# Patient Record
Sex: FEMALE | Race: WHITE | URBAN - METROPOLITAN AREA
[De-identification: names, ages, dates, MRNs, and addresses within clinical notes are randomized per-mention and may not be internally consistent; named-entity substitution may affect disease eponyms.]

---

## 2017-01-23 ENCOUNTER — FOLLOW UP ESTABLISHED (OUTPATIENT)
Dept: URBAN - METROPOLITAN AREA CLINIC 24 | Facility: CLINIC | Age: 72
End: 2017-01-23
Payer: COMMERCIAL

## 2017-01-23 PROCEDURE — 92235 FLUORESCEIN ANGRPH MLTIFRAME: CPT | Performed by: OPHTHALMOLOGY

## 2017-01-23 PROCEDURE — 67028 INJECTION EYE DRUG: CPT | Performed by: OPHTHALMOLOGY

## 2017-01-23 PROCEDURE — 92134 CPTRZ OPH DX IMG PST SGM RTA: CPT | Performed by: OPHTHALMOLOGY

## 2017-01-23 ASSESSMENT — INTRAOCULAR PRESSURE
OS: 12
OD: 15

## 2017-02-09 ENCOUNTER — FOLLOW UP ESTABLISHED (OUTPATIENT)
Dept: URBAN - METROPOLITAN AREA CLINIC 24 | Facility: CLINIC | Age: 72
End: 2017-02-09
Payer: COMMERCIAL

## 2017-02-09 DIAGNOSIS — H40.2211 CHRONIC ANGLE-CLOSURE GLAUCOMA, RIGHT EYE, MILD STAGE: ICD-10-CM

## 2017-02-09 PROCEDURE — 92012 INTRM OPH EXAM EST PATIENT: CPT | Performed by: OPTOMETRIST

## 2017-02-09 RX ORDER — DORZOLAMIDE HYDROCHLORIDE AND TIMOLOL MALEATE 20; 5 MG/ML; MG/ML
SOLUTION OPHTHALMIC
Qty: 1 | Refills: 5 | Status: INACTIVE
Start: 2017-02-09 | End: 2017-12-20

## 2017-02-09 ASSESSMENT — INTRAOCULAR PRESSURE
OS: 27
OD: 17

## 2017-03-03 ENCOUNTER — FOLLOW UP ESTABLISHED (OUTPATIENT)
Dept: URBAN - METROPOLITAN AREA CLINIC 24 | Facility: CLINIC | Age: 72
End: 2017-03-03
Payer: COMMERCIAL

## 2017-03-03 PROCEDURE — 92083 EXTENDED VISUAL FIELD XM: CPT | Performed by: OPTOMETRIST

## 2017-03-03 PROCEDURE — 99214 OFFICE O/P EST MOD 30 MIN: CPT | Performed by: OPTOMETRIST

## 2017-03-03 PROCEDURE — 92134 CPTRZ OPH DX IMG PST SGM RTA: CPT | Performed by: OPTOMETRIST

## 2017-03-03 ASSESSMENT — INTRAOCULAR PRESSURE
OD: 19
OS: 25

## 2017-03-24 ENCOUNTER — CONSULT (OUTPATIENT)
Dept: URBAN - METROPOLITAN AREA CLINIC 24 | Facility: CLINIC | Age: 72
End: 2017-03-24
Payer: COMMERCIAL

## 2017-03-24 PROCEDURE — 92012 INTRM OPH EXAM EST PATIENT: CPT | Performed by: OPHTHALMOLOGY

## 2017-03-24 PROCEDURE — 92020 GONIOSCOPY: CPT | Performed by: OPHTHALMOLOGY

## 2017-03-24 ASSESSMENT — INTRAOCULAR PRESSURE
OS: 27
OD: 19

## 2017-04-03 ENCOUNTER — FOLLOW UP ESTABLISHED (OUTPATIENT)
Dept: URBAN - METROPOLITAN AREA CLINIC 24 | Facility: CLINIC | Age: 72
End: 2017-04-03
Payer: COMMERCIAL

## 2017-04-03 PROCEDURE — 67028 INJECTION EYE DRUG: CPT | Performed by: OPHTHALMOLOGY

## 2017-04-03 PROCEDURE — 92134 CPTRZ OPH DX IMG PST SGM RTA: CPT | Performed by: OPHTHALMOLOGY

## 2017-04-03 ASSESSMENT — INTRAOCULAR PRESSURE
OS: 17
OD: 17

## 2017-04-07 ENCOUNTER — FOLLOW UP ESTABLISHED (OUTPATIENT)
Dept: URBAN - METROPOLITAN AREA CLINIC 24 | Facility: CLINIC | Age: 72
End: 2017-04-07
Payer: COMMERCIAL

## 2017-04-07 PROCEDURE — 92012 INTRM OPH EXAM EST PATIENT: CPT | Performed by: OPHTHALMOLOGY

## 2017-04-07 ASSESSMENT — INTRAOCULAR PRESSURE
OS: 17
OD: 17

## 2017-06-14 ENCOUNTER — FOLLOW UP ESTABLISHED (OUTPATIENT)
Dept: URBAN - METROPOLITAN AREA CLINIC 24 | Facility: CLINIC | Age: 72
End: 2017-06-14
Payer: COMMERCIAL

## 2017-06-14 PROCEDURE — 67028 INJECTION EYE DRUG: CPT | Performed by: OPHTHALMOLOGY

## 2017-06-14 PROCEDURE — 92134 CPTRZ OPH DX IMG PST SGM RTA: CPT | Performed by: OPHTHALMOLOGY

## 2017-06-23 ENCOUNTER — FOLLOW UP ESTABLISHED (OUTPATIENT)
Dept: URBAN - METROPOLITAN AREA CLINIC 24 | Facility: CLINIC | Age: 72
End: 2017-06-23
Payer: COMMERCIAL

## 2017-06-23 PROCEDURE — 92014 COMPRE OPH EXAM EST PT 1/>: CPT | Performed by: OPHTHALMOLOGY

## 2017-06-23 ASSESSMENT — INTRAOCULAR PRESSURE
OS: 16
OD: 16

## 2017-08-30 ENCOUNTER — FOLLOW UP ESTABLISHED (OUTPATIENT)
Dept: URBAN - METROPOLITAN AREA CLINIC 24 | Facility: CLINIC | Age: 72
End: 2017-08-30
Payer: COMMERCIAL

## 2017-08-30 PROCEDURE — 92235 FLUORESCEIN ANGRPH MLTIFRAME: CPT | Performed by: OPHTHALMOLOGY

## 2017-08-30 PROCEDURE — 67028 INJECTION EYE DRUG: CPT | Performed by: OPHTHALMOLOGY

## 2017-08-30 PROCEDURE — 92134 CPTRZ OPH DX IMG PST SGM RTA: CPT | Performed by: OPHTHALMOLOGY

## 2017-08-30 ASSESSMENT — INTRAOCULAR PRESSURE
OD: 15
OS: 12

## 2017-09-20 ENCOUNTER — FOLLOW UP ESTABLISHED (OUTPATIENT)
Dept: URBAN - METROPOLITAN AREA CLINIC 24 | Facility: CLINIC | Age: 72
End: 2017-09-20
Payer: COMMERCIAL

## 2017-09-20 PROCEDURE — 92012 INTRM OPH EXAM EST PATIENT: CPT | Performed by: OPHTHALMOLOGY

## 2017-09-20 ASSESSMENT — INTRAOCULAR PRESSURE
OD: 15
OS: 16

## 2017-11-28 ENCOUNTER — FOLLOW UP ESTABLISHED (OUTPATIENT)
Dept: URBAN - METROPOLITAN AREA CLINIC 24 | Facility: CLINIC | Age: 72
End: 2017-11-28
Payer: COMMERCIAL

## 2017-11-28 PROCEDURE — 92250 FUNDUS PHOTOGRAPHY W/I&R: CPT | Performed by: OPHTHALMOLOGY

## 2017-11-28 PROCEDURE — 92014 COMPRE OPH EXAM EST PT 1/>: CPT | Performed by: OPHTHALMOLOGY

## 2017-11-28 PROCEDURE — 67028 INJECTION EYE DRUG: CPT | Performed by: OPHTHALMOLOGY

## 2017-11-28 PROCEDURE — 92235 FLUORESCEIN ANGRPH MLTIFRAME: CPT | Performed by: OPHTHALMOLOGY

## 2017-11-28 ASSESSMENT — INTRAOCULAR PRESSURE
OS: 10
OD: 10

## 2017-12-20 ENCOUNTER — FOLLOW UP ESTABLISHED (OUTPATIENT)
Dept: URBAN - METROPOLITAN AREA CLINIC 24 | Facility: CLINIC | Age: 72
End: 2017-12-20
Payer: COMMERCIAL

## 2017-12-20 PROCEDURE — 92012 INTRM OPH EXAM EST PATIENT: CPT | Performed by: OPHTHALMOLOGY

## 2017-12-20 PROCEDURE — 92083 EXTENDED VISUAL FIELD XM: CPT | Performed by: OPHTHALMOLOGY

## 2017-12-20 RX ORDER — BRIMONIDINE TARTRATE 2 MG/ML
0.2 % SOLUTION/ DROPS OPHTHALMIC
Qty: 3 | Refills: 1 | Status: INACTIVE
Start: 2017-12-20 | End: 2018-05-14

## 2017-12-20 RX ORDER — BRIMONIDINE TARTRATE 2 MG/ML
0.2 % SOLUTION/ DROPS OPHTHALMIC
Qty: 3 | Refills: 1 | Status: INACTIVE
Start: 2017-12-20 | End: 2017-12-20

## 2017-12-20 RX ORDER — DORZOLAMIDE HYDROCHLORIDE AND TIMOLOL MALEATE 20; 5 MG/ML; MG/ML
SOLUTION OPHTHALMIC
Qty: 3 | Refills: 1 | Status: INACTIVE
Start: 2017-12-20 | End: 2018-03-05

## 2017-12-20 RX ORDER — LATANOPROST 50 UG/ML
0.005 % SOLUTION OPHTHALMIC
Qty: 3 | Refills: 1 | Status: INACTIVE
Start: 2017-12-20 | End: 2018-05-14

## 2017-12-20 ASSESSMENT — INTRAOCULAR PRESSURE
OD: 15
OS: 15

## 2018-03-07 ENCOUNTER — FOLLOW UP ESTABLISHED (OUTPATIENT)
Dept: URBAN - METROPOLITAN AREA CLINIC 24 | Facility: CLINIC | Age: 73
End: 2018-03-07
Payer: MEDICARE

## 2018-03-07 PROCEDURE — 92134 CPTRZ OPH DX IMG PST SGM RTA: CPT | Performed by: OPHTHALMOLOGY

## 2018-03-07 PROCEDURE — 67028 INJECTION EYE DRUG: CPT | Performed by: OPHTHALMOLOGY

## 2018-03-07 ASSESSMENT — INTRAOCULAR PRESSURE
OD: 13
OS: 13

## 2018-03-30 ENCOUNTER — FOLLOW UP ESTABLISHED (OUTPATIENT)
Dept: URBAN - METROPOLITAN AREA CLINIC 24 | Facility: CLINIC | Age: 73
End: 2018-03-30
Payer: MEDICARE

## 2018-03-30 PROCEDURE — 92014 COMPRE OPH EXAM EST PT 1/>: CPT | Performed by: OPHTHALMOLOGY

## 2018-03-30 PROCEDURE — 92133 CPTRZD OPH DX IMG PST SGM ON: CPT | Performed by: OPHTHALMOLOGY

## 2018-03-30 ASSESSMENT — INTRAOCULAR PRESSURE
OS: 15
OD: 15

## 2018-04-02 ENCOUNTER — FOLLOW UP ESTABLISHED (OUTPATIENT)
Dept: URBAN - METROPOLITAN AREA CLINIC 24 | Facility: CLINIC | Age: 73
End: 2018-04-02
Payer: MEDICARE

## 2018-04-02 PROCEDURE — 92134 CPTRZ OPH DX IMG PST SGM RTA: CPT | Performed by: OPHTHALMOLOGY

## 2018-04-02 PROCEDURE — 67210 TREATMENT OF RETINAL LESION: CPT | Performed by: OPHTHALMOLOGY

## 2018-04-02 ASSESSMENT — INTRAOCULAR PRESSURE
OS: 12
OD: 23
OD: 13

## 2018-05-14 ENCOUNTER — FOLLOW UP ESTABLISHED (OUTPATIENT)
Dept: URBAN - METROPOLITAN AREA CLINIC 24 | Facility: CLINIC | Age: 73
End: 2018-05-14
Payer: MEDICARE

## 2018-05-14 PROCEDURE — 92235 FLUORESCEIN ANGRPH MLTIFRAME: CPT | Performed by: OPHTHALMOLOGY

## 2018-05-14 PROCEDURE — 92014 COMPRE OPH EXAM EST PT 1/>: CPT | Performed by: OPHTHALMOLOGY

## 2018-05-14 PROCEDURE — 99024 POSTOP FOLLOW-UP VISIT: CPT | Performed by: OPHTHALMOLOGY

## 2018-05-14 PROCEDURE — 92134 CPTRZ OPH DX IMG PST SGM RTA: CPT | Performed by: OPHTHALMOLOGY

## 2018-05-14 RX ORDER — LATANOPROST 50 UG/ML
0.005 % SOLUTION OPHTHALMIC
Qty: 3 | Refills: 1 | Status: INACTIVE
Start: 2018-05-14 | End: 2018-11-01

## 2018-05-14 RX ORDER — BRIMONIDINE TARTRATE 2 MG/ML
0.2 % SOLUTION/ DROPS OPHTHALMIC
Qty: 3 | Refills: 1 | Status: INACTIVE
Start: 2018-05-14 | End: 2018-11-01

## 2018-05-14 ASSESSMENT — INTRAOCULAR PRESSURE
OD: 16
OS: 15

## 2018-07-09 ENCOUNTER — FOLLOW UP ESTABLISHED (OUTPATIENT)
Dept: URBAN - METROPOLITAN AREA CLINIC 24 | Facility: CLINIC | Age: 73
End: 2018-07-09
Payer: MEDICARE

## 2018-07-09 PROCEDURE — 92250 FUNDUS PHOTOGRAPHY W/I&R: CPT | Performed by: OPHTHALMOLOGY

## 2018-07-09 PROCEDURE — 67028 INJECTION EYE DRUG: CPT | Performed by: OPHTHALMOLOGY

## 2018-07-09 ASSESSMENT — INTRAOCULAR PRESSURE
OS: 15
OD: 13

## 2018-07-20 ENCOUNTER — FOLLOW UP ESTABLISHED (OUTPATIENT)
Dept: URBAN - METROPOLITAN AREA CLINIC 24 | Facility: CLINIC | Age: 73
End: 2018-07-20
Payer: MEDICARE

## 2018-07-20 PROCEDURE — 92012 INTRM OPH EXAM EST PATIENT: CPT | Performed by: OPHTHALMOLOGY

## 2018-07-20 ASSESSMENT — INTRAOCULAR PRESSURE
OS: 14
OD: 14

## 2018-08-20 ENCOUNTER — FOLLOW UP ESTABLISHED (OUTPATIENT)
Dept: URBAN - METROPOLITAN AREA CLINIC 24 | Facility: CLINIC | Age: 73
End: 2018-08-20
Payer: MEDICARE

## 2018-08-20 PROCEDURE — 92134 CPTRZ OPH DX IMG PST SGM RTA: CPT | Performed by: OPHTHALMOLOGY

## 2018-08-20 PROCEDURE — 67028 INJECTION EYE DRUG: CPT | Performed by: OPHTHALMOLOGY

## 2018-08-20 ASSESSMENT — INTRAOCULAR PRESSURE
OD: 17
OS: 13

## 2018-10-01 ENCOUNTER — FOLLOW UP ESTABLISHED (OUTPATIENT)
Dept: URBAN - METROPOLITAN AREA CLINIC 24 | Facility: CLINIC | Age: 73
End: 2018-10-01
Payer: MEDICARE

## 2018-10-01 PROCEDURE — 92014 COMPRE OPH EXAM EST PT 1/>: CPT | Performed by: OPHTHALMOLOGY

## 2018-10-01 PROCEDURE — 92250 FUNDUS PHOTOGRAPHY W/I&R: CPT | Performed by: OPHTHALMOLOGY

## 2018-10-01 PROCEDURE — 92235 FLUORESCEIN ANGRPH MLTIFRAME: CPT | Performed by: OPHTHALMOLOGY

## 2018-10-01 ASSESSMENT — INTRAOCULAR PRESSURE
OS: 15
OD: 12

## 2018-10-12 ENCOUNTER — FOLLOW UP ESTABLISHED (OUTPATIENT)
Dept: URBAN - METROPOLITAN AREA CLINIC 24 | Facility: CLINIC | Age: 73
End: 2018-10-12
Payer: MEDICARE

## 2018-10-12 PROCEDURE — 92083 EXTENDED VISUAL FIELD XM: CPT | Performed by: OPHTHALMOLOGY

## 2018-10-12 PROCEDURE — 92014 COMPRE OPH EXAM EST PT 1/>: CPT | Performed by: OPHTHALMOLOGY

## 2018-10-12 RX ORDER — DORZOLAMIDE HYDROCHLORIDE AND TIMOLOL MALEATE 20; 5 MG/ML; MG/ML
SOLUTION/ DROPS OPHTHALMIC
Qty: 1 | Refills: 3 | Status: INACTIVE
Start: 2018-10-12 | End: 2019-05-24

## 2018-10-12 RX ORDER — DORZOLAMIDE HYDROCHLORIDE AND TIMOLOL MALEATE 20; 5 MG/ML; MG/ML
SOLUTION/ DROPS OPHTHALMIC
Qty: 0 | Refills: 0 | Status: INACTIVE
Start: 2018-10-12 | End: 2019-07-19

## 2018-10-12 ASSESSMENT — INTRAOCULAR PRESSURE
OD: 14
OS: 13

## 2018-11-26 ENCOUNTER — FOLLOW UP ESTABLISHED (OUTPATIENT)
Dept: URBAN - METROPOLITAN AREA CLINIC 24 | Facility: CLINIC | Age: 73
End: 2018-11-26
Payer: MEDICARE

## 2018-11-26 DIAGNOSIS — H25.13 AGE-RELATED NUCLEAR CATARACT, BILATERAL: ICD-10-CM

## 2018-11-26 PROCEDURE — 92014 COMPRE OPH EXAM EST PT 1/>: CPT | Performed by: OPHTHALMOLOGY

## 2018-11-26 PROCEDURE — 92134 CPTRZ OPH DX IMG PST SGM RTA: CPT | Performed by: OPHTHALMOLOGY

## 2018-11-26 ASSESSMENT — INTRAOCULAR PRESSURE
OD: 16
OS: 15

## 2019-01-25 ENCOUNTER — FOLLOW UP ESTABLISHED (OUTPATIENT)
Dept: URBAN - METROPOLITAN AREA CLINIC 24 | Facility: CLINIC | Age: 74
End: 2019-01-25
Payer: MEDICARE

## 2019-01-25 PROCEDURE — 92012 INTRM OPH EXAM EST PATIENT: CPT | Performed by: OPHTHALMOLOGY

## 2019-01-25 ASSESSMENT — INTRAOCULAR PRESSURE
OS: 12
OD: 14

## 2019-03-18 ENCOUNTER — FOLLOW UP ESTABLISHED (OUTPATIENT)
Dept: URBAN - METROPOLITAN AREA CLINIC 24 | Facility: CLINIC | Age: 74
End: 2019-03-18
Payer: MEDICARE

## 2019-03-18 PROCEDURE — 92014 COMPRE OPH EXAM EST PT 1/>: CPT | Performed by: OPHTHALMOLOGY

## 2019-03-18 PROCEDURE — 92134 CPTRZ OPH DX IMG PST SGM RTA: CPT | Performed by: OPHTHALMOLOGY

## 2019-03-18 ASSESSMENT — INTRAOCULAR PRESSURE
OD: 13
OS: 10

## 2019-05-24 ENCOUNTER — FOLLOW UP ESTABLISHED (OUTPATIENT)
Dept: URBAN - METROPOLITAN AREA CLINIC 24 | Facility: CLINIC | Age: 74
End: 2019-05-24
Payer: MEDICARE

## 2019-05-24 PROCEDURE — 92012 INTRM OPH EXAM EST PATIENT: CPT | Performed by: OPHTHALMOLOGY

## 2019-05-24 PROCEDURE — 92133 CPTRZD OPH DX IMG PST SGM ON: CPT | Performed by: OPHTHALMOLOGY

## 2019-05-24 ASSESSMENT — INTRAOCULAR PRESSURE
OD: 15
OS: 15

## 2019-06-24 ENCOUNTER — FOLLOW UP ESTABLISHED (OUTPATIENT)
Dept: URBAN - METROPOLITAN AREA CLINIC 24 | Facility: CLINIC | Age: 74
End: 2019-06-24
Payer: MEDICARE

## 2019-06-24 PROCEDURE — 92235 FLUORESCEIN ANGRPH MLTIFRAME: CPT | Performed by: OPHTHALMOLOGY

## 2019-06-24 PROCEDURE — 92014 COMPRE OPH EXAM EST PT 1/>: CPT | Performed by: OPHTHALMOLOGY

## 2019-06-24 PROCEDURE — 92250 FUNDUS PHOTOGRAPHY W/I&R: CPT | Performed by: OPHTHALMOLOGY

## 2019-06-24 ASSESSMENT — INTRAOCULAR PRESSURE
OD: 14
OS: 13

## 2019-07-19 ENCOUNTER — FOLLOW UP ESTABLISHED (OUTPATIENT)
Dept: URBAN - METROPOLITAN AREA CLINIC 24 | Facility: CLINIC | Age: 74
End: 2019-07-19
Payer: MEDICARE

## 2019-07-19 PROCEDURE — 92012 INTRM OPH EXAM EST PATIENT: CPT | Performed by: OPHTHALMOLOGY

## 2019-07-19 RX ORDER — LATANOPROST 50 UG/ML
0.005 % SOLUTION OPHTHALMIC
Qty: 3 | Refills: 1 | Status: ACTIVE
Start: 2019-07-19

## 2019-07-19 RX ORDER — DORZOLAMIDE HYDROCHLORIDE AND TIMOLOL MALEATE 20; 5 MG/ML; MG/ML
SOLUTION/ DROPS OPHTHALMIC
Qty: 3 | Refills: 1 | Status: ACTIVE
Start: 2019-07-19

## 2019-07-19 ASSESSMENT — INTRAOCULAR PRESSURE
OS: 13
OD: 13

## 2019-10-09 ENCOUNTER — FOLLOW UP ESTABLISHED (OUTPATIENT)
Dept: URBAN - METROPOLITAN AREA CLINIC 24 | Facility: CLINIC | Age: 74
End: 2019-10-09
Payer: MEDICARE

## 2019-10-09 PROCEDURE — 92250 FUNDUS PHOTOGRAPHY W/I&R: CPT | Performed by: OPHTHALMOLOGY

## 2019-10-09 PROCEDURE — 92014 COMPRE OPH EXAM EST PT 1/>: CPT | Performed by: OPHTHALMOLOGY

## 2019-10-09 ASSESSMENT — INTRAOCULAR PRESSURE
OD: 18
OS: 17

## 2019-11-13 ENCOUNTER — FOLLOW UP ESTABLISHED (OUTPATIENT)
Dept: URBAN - METROPOLITAN AREA CLINIC 24 | Facility: CLINIC | Age: 74
End: 2019-11-13
Payer: MEDICARE

## 2019-11-13 PROCEDURE — 92083 EXTENDED VISUAL FIELD XM: CPT | Performed by: OPHTHALMOLOGY

## 2019-11-13 PROCEDURE — 92012 INTRM OPH EXAM EST PATIENT: CPT | Performed by: OPHTHALMOLOGY

## 2019-11-13 RX ORDER — BRIMONIDINE TARTRATE 2 MG/ML
0.2 % SOLUTION/ DROPS OPHTHALMIC
Qty: 3 | Refills: 1 | Status: ACTIVE
Start: 2019-11-13

## 2019-11-13 ASSESSMENT — INTRAOCULAR PRESSURE
OS: 14
OD: 14

## 2020-02-28 ENCOUNTER — FOLLOW UP ESTABLISHED (OUTPATIENT)
Dept: URBAN - METROPOLITAN AREA CLINIC 24 | Facility: CLINIC | Age: 75
End: 2020-02-28
Payer: MEDICARE

## 2020-02-28 PROCEDURE — 92012 INTRM OPH EXAM EST PATIENT: CPT | Performed by: OPHTHALMOLOGY

## 2020-02-28 ASSESSMENT — INTRAOCULAR PRESSURE
OD: 15
OS: 15

## 2020-06-19 ENCOUNTER — FOLLOW UP ESTABLISHED (OUTPATIENT)
Dept: URBAN - METROPOLITAN AREA CLINIC 24 | Facility: CLINIC | Age: 75
End: 2020-06-19
Payer: MEDICARE

## 2020-06-19 PROCEDURE — 92133 CPTRZD OPH DX IMG PST SGM ON: CPT | Performed by: OPHTHALMOLOGY

## 2020-06-19 PROCEDURE — 92012 INTRM OPH EXAM EST PATIENT: CPT | Performed by: OPHTHALMOLOGY

## 2020-06-19 ASSESSMENT — INTRAOCULAR PRESSURE
OD: 14
OS: 15

## 2020-08-17 ENCOUNTER — FOLLOW UP ESTABLISHED (OUTPATIENT)
Dept: URBAN - METROPOLITAN AREA CLINIC 24 | Facility: CLINIC | Age: 75
End: 2020-08-17
Payer: MEDICARE

## 2020-08-17 PROCEDURE — 92250 FUNDUS PHOTOGRAPHY W/I&R: CPT | Performed by: OPHTHALMOLOGY

## 2020-08-17 PROCEDURE — 92014 COMPRE OPH EXAM EST PT 1/>: CPT | Performed by: OPHTHALMOLOGY

## 2020-08-17 PROCEDURE — 92235 FLUORESCEIN ANGRPH MLTIFRAME: CPT | Performed by: OPHTHALMOLOGY

## 2020-08-17 ASSESSMENT — INTRAOCULAR PRESSURE
OS: 13
OD: 13

## 2021-10-27 ENCOUNTER — OFFICE VISIT (OUTPATIENT)
Dept: URBAN - METROPOLITAN AREA CLINIC 24 | Facility: CLINIC | Age: 76
End: 2021-10-27
Payer: MEDICARE

## 2021-10-27 PROCEDURE — 92235 FLUORESCEIN ANGRPH MLTIFRAME: CPT | Performed by: OPHTHALMOLOGY

## 2021-10-27 PROCEDURE — 92250 FUNDUS PHOTOGRAPHY W/I&R: CPT | Performed by: OPHTHALMOLOGY

## 2021-10-27 PROCEDURE — 99214 OFFICE O/P EST MOD 30 MIN: CPT | Performed by: OPHTHALMOLOGY

## 2021-10-27 PROCEDURE — 92134 CPTRZ OPH DX IMG PST SGM RTA: CPT | Performed by: OPHTHALMOLOGY

## 2021-10-27 ASSESSMENT — INTRAOCULAR PRESSURE
OD: 11
OS: 14

## 2021-10-27 NOTE — IMPRESSION/PLAN
Impression: Cataract OU, POAG Plan: Discussed phacomorphic issues-  and pt still defers Ce/IOL for now   - Defer to [facility] for [place]  / IOP care. THIS IS WORSE. At age 78.  . Amanda Dennis to [facility] and [address]

## 2021-10-27 NOTE — IMPRESSION/PLAN
Impression: Branch ret. vein occl w/ CME OS -hx Avastin OS - FLTx '18  - suspend Plan: Hx [[BRVO/CME chronic. Pt MISSED Retina care for while -Fltx added Apr '18]]. TODAY NO recur CME (s/p laser). NOW time to address CATARACT w Orin Eckert    RTC 1y (s/p laser, '18)  w pos FA update OS -  Re-inj ONLY IF RECUR CME - PRN

## 2022-02-16 ENCOUNTER — OFFICE VISIT (OUTPATIENT)
Dept: URBAN - METROPOLITAN AREA CLINIC 24 | Facility: CLINIC | Age: 77
End: 2022-02-16
Payer: MEDICARE

## 2022-02-16 DIAGNOSIS — H40.2223 CHRONIC ANGLE-CLOSURE GLAUCOMA, LEFT EYE, SEVERE STAGE: Primary | ICD-10-CM

## 2022-02-16 PROCEDURE — 92133 CPTRZD OPH DX IMG PST SGM ON: CPT | Performed by: OPHTHALMOLOGY

## 2022-02-16 PROCEDURE — 99214 OFFICE O/P EST MOD 30 MIN: CPT | Performed by: OPHTHALMOLOGY

## 2022-02-16 PROCEDURE — 92083 EXTENDED VISUAL FIELD XM: CPT | Performed by: OPHTHALMOLOGY

## 2022-02-16 ASSESSMENT — VISUAL ACUITY: OS: 20/30

## 2022-02-16 ASSESSMENT — INTRAOCULAR PRESSURE
OD: 14
OS: 16

## 2022-02-16 NOTE — IMPRESSION/PLAN
Impression: Age-related nuclear cataract, bilateral Plan: pt is more symptomatic and desires to proceed with cat sx, os then od,

## 2022-02-16 NOTE — IMPRESSION/PLAN
Impression: Branch retinal vein occlusion w/ macular edema, left eye  - h/o Avastin OS Plan: continue care Dr. Bay Feliciano as directed.; CME chronic. s/p inj's os. Discussed signs and symptoms of retinal detachment (flashes,floaters, curtain) as precaution . Patient instructed to call or return to clinic if condition gets worse.

## 2022-02-16 NOTE — IMPRESSION/PLAN
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  
pt denies fam hx glc; pt denies heart/lung problems - S/P LPI OU 2/15 -- Tmax: OD:  21, OS: 27 (on topicals os) -- no lung dz        - denies Sulfa Allergy/Sleep Apnea/Migraines ; no fdts Plan: PLAN:  On Latanoprost QHS OU, Dorzolamide-Timolol BID OU, Brimonidine BID OS ; VFT shows possible worsening os and stable od, OCT shows worsening/fluctuation ou and  IOP is mildly higher os, so cont meds but make brim tid os now and may proceed with cat eval, Drops instructions given today [2/16/22]  ((SLT may not be option since view of angle structures for slt is limited ))  (( TARGET ~< to determine ou, mid teens ou for now  ))  Bring gtts each visit TESTS:
2/16/22 Visual Field - OD: Poor-mild scatter; OS: Fair-global constriction, worsening / brvo contributes 2/16/22 OCT - OD: Fair-66 (72, 76, 80,80) mild inf thinning, artifacts; OS: Poor-58 (62, 60, 58,59) thin sup and inf Discussed Glaucoma diagnosis in detail with patient. Emphasized and explained compliance. Poor compliance can lead to blindness. Educational materials provided: 03/24/2017 BRING YOUR DROPS EVERY VISIT.

## 2022-02-24 ENCOUNTER — TESTING ONLY (OUTPATIENT)
Dept: URBAN - METROPOLITAN AREA CLINIC 24 | Facility: CLINIC | Age: 77
End: 2022-02-24
Payer: MEDICARE

## 2022-02-24 ASSESSMENT — PACHYMETRY
OD: 23.19
OS: 2.89
OS: 23.13
OD: 3.02

## 2022-03-02 ENCOUNTER — PRE-OPERATIVE VISIT (OUTPATIENT)
Dept: URBAN - METROPOLITAN AREA CLINIC 24 | Facility: CLINIC | Age: 77
End: 2022-03-02
Payer: MEDICARE

## 2022-03-02 DIAGNOSIS — H52.13 MYOPIA, BILATERAL: ICD-10-CM

## 2022-03-02 DIAGNOSIS — H52.201 ASTIGMATISM OF RIGHT EYE: ICD-10-CM

## 2022-03-02 DIAGNOSIS — H25.11 AGE-RELATED NUCLEAR CATARACT, RIGHT EYE: ICD-10-CM

## 2022-03-02 DIAGNOSIS — H34.8320 TRIBUTARY (BRANCH) RETINAL VEIN OCCLUSION, LEFT EYE, WITH MACULAR EDEMA: ICD-10-CM

## 2022-03-02 PROCEDURE — 92020 GONIOSCOPY: CPT | Performed by: OPHTHALMOLOGY

## 2022-03-02 PROCEDURE — 99214 OFFICE O/P EST MOD 30 MIN: CPT | Performed by: OPHTHALMOLOGY

## 2022-03-02 RX ORDER — OFLOXACIN 3 MG/ML
0.3 % SOLUTION/ DROPS OPHTHALMIC
Qty: 5 | Refills: 1 | Status: ACTIVE
Start: 2022-03-02

## 2022-03-02 RX ORDER — PREDNISOLONE ACETATE 10 MG/ML
1 % SUSPENSION/ DROPS OPHTHALMIC
Qty: 5 | Refills: 1 | Status: ACTIVE
Start: 2022-03-02

## 2022-03-02 RX ORDER — DICLOFENAC SODIUM 1 MG/ML
0.1 % SOLUTION/ DROPS OPHTHALMIC
Qty: 5 | Refills: 1 | Status: ACTIVE
Start: 2022-03-02

## 2022-03-02 ASSESSMENT — INTRAOCULAR PRESSURE
OD: 16
OS: 18

## 2022-03-02 ASSESSMENT — PACHYMETRY
OD: 3.02
OS: 23.13
OS: 2.89
OD: 23.19

## 2022-03-02 NOTE — IMPRESSION/PLAN
Impression: Astigmatism of right eye: H52.201. Plan: Discussed with patient that they will need glasses after surgery. Discussed with patient, understands this may limit vision after surgery. Also discussed unmasking of astigmatism.

## 2022-03-02 NOTE — IMPRESSION/PLAN
Impression: Branch retinal vein occlusion w/ macular edema, left eye  - h/o Avastin OS Plan: continue care Dr. Clark Morales as directed.; CME chronic. s/p inj's os. Discussed signs and symptoms of retinal detachment (flashes,floaters, curtain) as precaution . Patient instructed to call or return to clinic if condition gets worse. Discussed with patient, understands this may limit vision after surgery.

## 2022-03-02 NOTE — IMPRESSION/PLAN
Impression: Age-related nuclear cataract, bilateral Plan: (( AIM -0.25 OU: No INJECTABLE OU , drops only ( monocular, glaucoma), Epi, STRETCH, poss ring, ORA OU, No LRI OU (irreg/minimal astig), MIGS OU (KDB 1st, Istent as backup) do not tape head, Glaucoma coverage, DENSE, monocular od, s/p injections os )) Discussed cataract diagnosis with the patient. Appropriate testing ordered for cataract diagnosis prior to Preop. Risks and benefits of surgical treatment were discussed and understood. Patient desires surgical treatment. Discussed lens options with pt and pt is ok with wearing glasses after surgery. Patient desires surgery to proceed with surgery OU, OS First. Discussed with patient loss of myopia and she understands and wishes to proceed with distance aim. Both eyes examined, medically necessary due to impact in activities of daily living. Discussed with pt limitations of MIGS device and it does not replace  Glaucoma eye drops and would have to continue treatment and would still need glasses after surgery. Discussed higher risks with smaller pupil and discussed iris stretch and higher risks of bleeding. Discussed there is a chance of developing capsular haze after surgery, which may be corrected with laser/yag after sx. Discussed higher risk of snuff out/blindness. Discussed monocular precautions with patient. Advised pt to wear protective eyewear. Understands higher risk of complication and delayed healing due to dense cataract/glaucoma/prior injections . It has been explained to patient that patient must carefully weigh the risks vs. benefits of having surgery on the better seeing right eye.

## 2022-03-17 ENCOUNTER — ADULT PHYSICAL (OUTPATIENT)
Dept: URBAN - METROPOLITAN AREA CLINIC 24 | Facility: CLINIC | Age: 77
End: 2022-03-17
Payer: MEDICARE

## 2022-03-17 DIAGNOSIS — Z01.818 ENCOUNTER FOR OTHER PREPROCEDURAL EXAMINATION: Primary | ICD-10-CM

## 2022-03-17 PROCEDURE — 99202 OFFICE O/P NEW SF 15 MIN: CPT | Performed by: PHYSICIAN ASSISTANT

## 2022-03-23 ENCOUNTER — SURGERY (OUTPATIENT)
Dept: URBAN - METROPOLITAN AREA SURGERY 12 | Facility: SURGERY | Age: 77
End: 2022-03-23
Payer: MEDICARE

## 2022-03-23 PROCEDURE — PR1CP PR1CP: CUSTOM | Performed by: OPHTHALMOLOGY

## 2022-03-24 ENCOUNTER — POST-OPERATIVE VISIT (OUTPATIENT)
Dept: URBAN - METROPOLITAN AREA CLINIC 24 | Facility: CLINIC | Age: 77
End: 2022-03-24
Payer: MEDICARE

## 2022-03-24 PROCEDURE — 99024 POSTOP FOLLOW-UP VISIT: CPT | Performed by: STUDENT IN AN ORGANIZED HEALTH CARE EDUCATION/TRAINING PROGRAM

## 2022-03-24 ASSESSMENT — INTRAOCULAR PRESSURE: OS: 18

## 2022-03-24 NOTE — IMPRESSION/PLAN
Impression: S/P Cataract Extraction by phacoemulsification with IOL placement/ORA/KDB OS - 1 Day. Encounter for surgical aftercare following surgery on a sense organ  Z48.810. Post operative instructions reviewed - Condition is improving - Plan: Typed gtt instructions for pt to bring to nursing home

## 2022-03-30 ENCOUNTER — POST-OPERATIVE VISIT (OUTPATIENT)
Dept: URBAN - METROPOLITAN AREA CLINIC 24 | Facility: CLINIC | Age: 77
End: 2022-03-30
Payer: MEDICARE

## 2022-03-30 DIAGNOSIS — Z48.810 ENCOUNTER FOR SURGICAL AFTERCARE FOLLOWING SURGERY ON A SENSE ORGAN: Primary | ICD-10-CM

## 2022-03-30 PROCEDURE — 99024 POSTOP FOLLOW-UP VISIT: CPT | Performed by: STUDENT IN AN ORGANIZED HEALTH CARE EDUCATION/TRAINING PROGRAM

## 2022-03-30 ASSESSMENT — INTRAOCULAR PRESSURE
OD: 17
OS: 17

## 2022-03-30 ASSESSMENT — VISUAL ACUITY: OS: 20/30

## 2022-03-30 NOTE — IMPRESSION/PLAN
Impression: S/P Cataract Extraction by phacoemulsification with IOL placement/ORA/KDB OS - 7 Days. Encounter for surgical aftercare following surgery on a sense organ  Z48.810.  Post operative instructions reviewed - Condition is improving - Plan: Pt understands limitations to vision OS, happy with outcome

## 2022-04-06 ENCOUNTER — SURGERY (OUTPATIENT)
Dept: URBAN - METROPOLITAN AREA SURGERY 12 | Facility: SURGERY | Age: 77
End: 2022-04-06
Payer: MEDICARE

## 2022-04-06 DIAGNOSIS — H40.2233 CHRONIC ANGLE-CLOSURE GLAUCOMA, BILATERAL, SEVERE STAGE: ICD-10-CM

## 2022-04-06 PROCEDURE — PR1CP PR1CP: CUSTOM | Performed by: OPHTHALMOLOGY

## 2022-04-07 ENCOUNTER — POST-OPERATIVE VISIT (OUTPATIENT)
Dept: URBAN - METROPOLITAN AREA CLINIC 24 | Facility: CLINIC | Age: 77
End: 2022-04-07
Payer: MEDICARE

## 2022-04-07 PROCEDURE — 99024 POSTOP FOLLOW-UP VISIT: CPT | Performed by: STUDENT IN AN ORGANIZED HEALTH CARE EDUCATION/TRAINING PROGRAM

## 2022-04-07 ASSESSMENT — INTRAOCULAR PRESSURE: OD: 16

## 2022-04-07 NOTE — IMPRESSION/PLAN
Impression: S/P Cataract Extraction by phacoemulsification with IOL placement/ORA/KDB OD - 1 Day. Encounter for surgical aftercare following surgery on a sense organ  Z48.810.  Condition is improving - Plan: Reviewed gtts, printed directions for update with nursing facility

## 2022-05-19 ENCOUNTER — POST-OPERATIVE VISIT (OUTPATIENT)
Dept: URBAN - METROPOLITAN AREA CLINIC 26 | Facility: CLINIC | Age: 77
End: 2022-05-19
Payer: MEDICARE

## 2022-05-19 DIAGNOSIS — Z48.810 ENCOUNTER FOR SURGICAL AFTERCARE FOLLOWING SURGERY ON A SENSE ORGAN: Primary | ICD-10-CM

## 2022-05-19 PROCEDURE — 99024 POSTOP FOLLOW-UP VISIT: CPT | Performed by: OPTOMETRIST

## 2022-05-19 ASSESSMENT — VISUAL ACUITY
OS: 20/25
OD: 20/20

## 2022-05-19 ASSESSMENT — INTRAOCULAR PRESSURE
OD: 16
OS: 16

## 2022-05-19 NOTE — IMPRESSION/PLAN
Impression: S/P Cataract Extraction by phacoemulsification with IOL placement/ORA/KDB OD - 43 Days. Encounter for surgical aftercare following surgery on a sense organ  Z48.810. Plan: drops. cont glc drops.

## 2022-08-18 ENCOUNTER — OFFICE VISIT (OUTPATIENT)
Dept: URBAN - METROPOLITAN AREA CLINIC 26 | Facility: CLINIC | Age: 77
End: 2022-08-18
Payer: MEDICARE

## 2022-08-18 DIAGNOSIS — H40.2223 CHRONIC ANGLE-CLOSURE GLAUCOMA, LEFT EYE, SEVERE STAGE: Primary | ICD-10-CM

## 2022-08-18 PROCEDURE — 99213 OFFICE O/P EST LOW 20 MIN: CPT | Performed by: OPTOMETRIST

## 2022-08-18 ASSESSMENT — INTRAOCULAR PRESSURE
OS: 11
OD: 11

## 2022-08-18 NOTE — IMPRESSION/PLAN
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  
pt denies fam hx glc; pt denies heart/lung problems - S/P LPI OU 2/15 -- Tmax: OD:  21, OS: 27 (on topicals os) -- no lung dz        - denies Sulfa Allergy/Sleep Apnea/Migraines ; no fdts Plan: IOP at acceptable levels OU (11/11) today. Continue Latanoprost QHS OU, Dorzolamide-Timolol BID OU, Discontinue Brimonidine F/U 3months IOP

02/22 OCT RNFL OD 66  (72 76, 80,80) mild inf thinning. OCT RNFL OS 58 (62 60, 58,59) thin sup and inf, artifacts. 02/22 VF OD) Scattered Defects  VF OS) global constriction/central island, stable from previous / hrvo contributes. Discussed Glaucoma diagnosis in detail with patient. Emphasized and explained compliance. Poor compliance can lead to blindness. BRING YOUR DROPS EVERY VISIT.

## 2022-10-27 ENCOUNTER — OFFICE VISIT (OUTPATIENT)
Dept: URBAN - METROPOLITAN AREA CLINIC 24 | Facility: CLINIC | Age: 77
End: 2022-10-27
Payer: MEDICARE

## 2022-10-27 DIAGNOSIS — H34.8320 TRIBUTARY (BRANCH) RETINAL VEIN OCCLUSION, LEFT EYE, WITH MACULAR EDEMA: Primary | ICD-10-CM

## 2022-10-27 PROCEDURE — 92134 CPTRZ OPH DX IMG PST SGM RTA: CPT | Performed by: OPHTHALMOLOGY

## 2022-10-27 PROCEDURE — 99214 OFFICE O/P EST MOD 30 MIN: CPT | Performed by: OPHTHALMOLOGY

## 2022-10-27 PROCEDURE — 92235 FLUORESCEIN ANGRPH MLTIFRAME: CPT | Performed by: OPHTHALMOLOGY

## 2022-10-27 PROCEDURE — 92250 FUNDUS PHOTOGRAPHY W/I&R: CPT | Performed by: OPHTHALMOLOGY

## 2022-10-27 ASSESSMENT — INTRAOCULAR PRESSURE
OD: 12
OS: 12

## 2022-10-27 NOTE — IMPRESSION/PLAN
Impression: Branch ret. vein occl w/ CME OS -hx Avastin OS - FLTx '18  - suspend Plan: Hx [[BRVO/CME chronic. Pt MISSED Retina care for while -Fltx added Apr '18]]. TODAY NO recur CME (s/p laser).    RVO RESOLVED    IMPRVED s/p CE/IOL '22
     RETINA STABLE NOW -- RTC PRN w pos FA if recur CME

## 2023-11-08 ENCOUNTER — OFFICE VISIT (OUTPATIENT)
Dept: URBAN - METROPOLITAN AREA CLINIC 24 | Facility: CLINIC | Age: 78
End: 2023-11-08
Payer: MEDICARE

## 2023-11-08 DIAGNOSIS — H34.8320 TRIBUTARY (BRANCH) RETINAL VEIN OCCLUSION, LEFT EYE, WITH MACULAR EDEMA: Primary | ICD-10-CM

## 2023-11-08 PROCEDURE — 92134 CPTRZ OPH DX IMG PST SGM RTA: CPT | Performed by: OPHTHALMOLOGY

## 2023-11-08 PROCEDURE — 99214 OFFICE O/P EST MOD 30 MIN: CPT | Performed by: OPHTHALMOLOGY

## 2023-11-08 ASSESSMENT — INTRAOCULAR PRESSURE
OS: 22
OD: 17

## 2024-01-18 NOTE — IMPRESSION/PLAN
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  
pt denies fam hx glc; pt denies heart/lung problems - S/P LPI OU 2/15 -- Tmax: OD:  21, OS: 27 (on topicals os) -- no lung dz        - denies Sulfa Allergy/Sleep Apnea/Migraines ; no fdts Plan: PLAN:  On Latanoprost QHS OU, Dorzolamide-Timolol BID OU, Brimonidine TID OS ;  IOP is higher ou, and so may proceed with cataract surgery with MIGS  (KDB 1st, Istent at backup) and Discussed glaucoma may limit vision after surgery, may proceed with migs   in hopes of better iop control - understands does not eliminate meds. Discussed possible unmasking of scotoma after surgery. Drops instructions given [2/16/22]  ((SLT may not be option since view of angle structures for slt is limited ))  (( TARGET ~< to determine ou, mid teens ou for now  ))  Bring gtts each visit TESTS:
2/16/22 Visual Field - OD: Poor-mild scatter; OS: Fair-global constriction, worsening / brvo contributes 2/16/22 OCT - OD: Fair-66 (72, 76, 80,80) mild inf thinning, artifacts; OS: Poor-58 (62, 60, 58,59) thin sup and inf Discussed Glaucoma diagnosis in detail with patient. Emphasized and explained compliance. Poor compliance can lead to blindness. Educational materials provided: 03/24/2017 BRING YOUR DROPS EVERY VISIT. 129